# Patient Record
Sex: FEMALE | Race: WHITE | Employment: PART TIME | URBAN - METROPOLITAN AREA
[De-identification: names, ages, dates, MRNs, and addresses within clinical notes are randomized per-mention and may not be internally consistent; named-entity substitution may affect disease eponyms.]

---
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
## 2019-02-13 NOTE — PROGRESS NOTES
I think the biggest problem is that she is not absorbing it for some reason, the fetus won't see high doses when her blood levels are so low which is the reason I think we may need to pursue IV doses but can't find good data on this Inocente Thomason MD Jemma Rehman  : [facility] [address]LLA WHEAT  [address], [address], [facility] [place].  [phone]   [phone]         OUTPATIENT PHYSICAL THERAPY:Initial Assessment 2017    ICD-10: Treatment Diagnosis:  Other instability, right shoulder (M25.311), Other instability, left shoulder (M25.312)  Precautions/Allergies: NKDA  Fall Risk Score: 0 (? 5 = High Risk)  MD Orders: eval and treat, HEP, Strengthen, ROM MEDICAL/REFERRING DIAGNOSIS:  bob shoulder/ core/eccentric   DATE OF ONSET: chronic  REFERRING PHYSICIAN: Martha Powell MD  RETURN PHYSICIAN APPOINTMENT: May 15, 2017     INITIAL ASSESSMENT:  Ms. 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 She likely has multiple direction instability based on her history and also c/o BOB patella popping and some LBP. She presents with excessive BOB shoulder ROM and weakness in BOB rotator cuff and scapula stabilizers as well as weak quads and core. She needs to be able to do some lifting at her job and would like to participate in normal activities for her age. She should benefit from skilled therapy to address these dysfunctions and help her build a HEP that will improve her strength, decrease episodes of subluxation and decrease pain. PROBLEM LIST (Impacting functional limitations):  1. Decreased Strength  2. Decreased ADL/Functional Activities  3. Increased Pain INTERVENTIONS PLANNED  1. Modalities PRN, including ultrasound, estim, and iontophoresis  2. Soft tissue and joint mobilization for ROM and flexibility  3. Stretching, progressive resistive exercises and HEP for return to functional activities. 4. Back Education and Training for body mechanics with activities of daily living  5. If needed, aquatic therapy.    TREATMENT PLAN:  Effective Dates: 17 TO 17 Frequency/Duration: 2 times a week for 8 weeks  GOALS: (Goals have been discussed and agreed upon with patient.)  Short-Term Functional Goals: Time Frame: 4 weeks  1. Lumbar Protective Mechanism > 3/5 for improved ability to brace trunk with functional activities. 2. Demonstrates good posture with functional activities. (squat, reach overhead, turning). 3. Independent with initial level HEP. 4. Good quad and ankle control up/down stairs. Discharge Goals: Time Frame: 8 weeks  1. Eccentric strength with shoulder ER, IR, flexion, abduction > 4/5 for improved control of motion. 2. Able to do throwing, pushing, and lifting activities at moderate level for improved function. 3. DASH score < 15/55 for improved function. 4. Independent with discharge level HEP for continued strengthening. 5. Pain levels are less than 3/10 and are manageable/ tolerable. Rehabilitation Potential For Stated Goals: Good  Regarding Cristi Jimenez's therapy, I certify that the treatment plan above will be carried out by a therapist or under their direction. Thank you for this referral,  Getachew Yan PT     Referring Physician Signature: Lamberto Potter MD              Date                    The information in this section was collected on 4/18/17 (except where otherwise noted). HISTORY:   History of Present Injury/Illness (Reason for Referral):  Has history of chronic shoulder subluxation on BOB shoulder and was told she has multi directional instability on both shoulders L>R. She states that if she lifts something heavy it can slip down or sometimes will flip out side to side. REcently when her L shoulder subluxed she pinched \"something\" and then decided to be seen by Dr. Clement Lindsey. Mostly uses R arm for functional activities. Has not been diagnosed with general ligamentous instability but reports BOB knee cap popping as well and history of LBP.   Past Medical History/Comorbidities:   Surgical history: tonsils and adonoids removed 2010.  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 Prior Level of Function/Work/Activity:  Chronic condition that usually would just feel uncomfortable but this past year starting to feel pain and having more episodes of subluxing. Dominant Side:         RIGHT  Personal Factors:     Currently works and sometimes has to do activities that may aggravate shoulders  Current Medications: none       Date Last Reviewed:  4/18/17   Number of Personal Factors/Comorbidities that affect the Plan of Care: 1-2: MODERATE COMPLEXITY   EXAMINATION:   Observation/Orthostatic Postural Assessment:          Standing:  Stands with posterior trunk shift and mild forward shoulder; also note R innominate elevated. Supine:  Functional leg length discrepancy   Palpation:          Stiff and tender at L 1st rib, upper trap, posterior scalenes        Hyper mobile BOB patella  ROM:  Shoulder AROM for BOB shoulders is beyond normal ROM. BBO shoulder flexion > 180, ER at 90/90 ~ 100 degrees BOB. Cervical AROM      4/18/17     Rot R 80%     Rot L 100%     SB R 100%     SB L 75%                         Strength:  Core:  Lumbar Protective Mechanism = 1/5    shoulder 4/18 4/18     Right Left    flexion 4 4    abduction 4 4    extension      ER  4 4-    IR 5 5    scapula              Special Tests:          C1/2 rotational dysfunction; Load and shift; R grade 2 to 3 (stopped test); L grade 3 (stopped test because of laxity)   Body Structures Involved:  1. Joints  2. Muscles  3. Ligaments Body Functions Affected:  1. Movement Related Activities and Participation Affected:  1.  General Tasks and Demands   Number of elements (examined above) that affect the Plan of Care: 3: MODERATE COMPLEXITY   CLINICAL PRESENTATION:   Presentation: Evolving clinical presentation with changing clinical characteristics: MODERATE COMPLEXITY   CLINICAL DECISION MAKING: Outcome Measure: Tool Used: Disabilities of the Arm, Shoulder and Hand (DASH) Questionnaire - Quick Version  Score:  Initial: 19/55  Most Recent: X/55 (Date: -- )   Interpretation of Score: The DASH is designed to measure the activities of daily living in person's with upper extremity dysfunction or pain. Each section is scored on a 1-5 scale, 5 representing the greatest disability. The scores of each section are added together for a total score of 55. Medical Necessity:   · Patient is expected to demonstrate progress in strength and stablity to improve safety during lifting, pushing activities. .  Reason for Services/Other Comments:  · Patient continues to require skilled intervention due to need for skilled guidance with HEP/therex. .   Use of outcome tool(s) and clinical judgement create a POC that gives a: Questionable prediction of patient's progress: MODERATE COMPLEXITY            TREATMENT:   (In addition to Assessment/Re-Assessment sessions the following treatments were rendered)  Pre-treatment Symptoms/Complaints:  Chronic history of BOB shoulder subluxation - multi directional L > R.  C/o pain in joint and at posterior shoulder. Pain at BOB Upper trap and also at low back. Pain: Initial:   Pain Intensity 1: 9 (at worst; currently at 3-4/10)  Post Session:  No change in pain. THERAPEUTIC EXERCISE ( 20 min) to improve core and shoulder stability: Hook lye sustaihned isotonics for trunk flexion to exhaustion x 3, bridging x 10, green tband shoulder ER x 10, squat education for proper posture then progression to sustained isotonics for 20 sec, then held squat and did green tband row and extension x 10 ea    Treatment/Session Assessment:    · Response to Treatment:  Had difficulty maintaining squat but should continue to improve. Needed multiple cues for good shoulder posture and controlling shoulder through range of exercise given. · Compliance with Program/Exercises:  Will assess as treatment progresses. · Recommendations/Intent for next treatment session:  \"Next visit will focus on review of HEP as needed and progression of core/shoulder program.    Total Treatment Duration: 65 min  PT Patient Time In/Time Out  Time In: 3048  Time Out: Rashaad 55, PT MSPT

## 2021-08-29 ENCOUNTER — HOSPITAL ENCOUNTER (EMERGENCY)
Age: 21
Discharge: HOME | End: 2021-08-29
Attending: EMERGENCY MEDICINE
Payer: COMMERCIAL

## 2021-08-29 ENCOUNTER — APPOINTMENT (OUTPATIENT)
Dept: GENERAL RADIOLOGY | Age: 21
End: 2021-08-29
Attending: EMERGENCY MEDICINE
Payer: COMMERCIAL

## 2021-08-29 VITALS
BODY MASS INDEX: 25.61 KG/M2 | WEIGHT: 150 LBS | RESPIRATION RATE: 16 BRPM | DIASTOLIC BLOOD PRESSURE: 58 MMHG | SYSTOLIC BLOOD PRESSURE: 95 MMHG | HEIGHT: 64 IN | HEART RATE: 78 BPM | OXYGEN SATURATION: 98 % | TEMPERATURE: 98.8 F

## 2021-08-29 DIAGNOSIS — R55 SYNCOPE, UNSPECIFIED SYNCOPE TYPE: Primary | ICD-10-CM

## 2021-08-29 LAB
ALBUMIN SERPL-MCNC: 4.1 G/DL (ref 3.5–5)
ALBUMIN/GLOB SERPL: 1 {RATIO} (ref 1.2–3.5)
ALP SERPL-CCNC: 91 U/L (ref 50–136)
ALT SERPL-CCNC: 21 U/L (ref 12–65)
ANION GAP SERPL CALC-SCNC: 5 MMOL/L (ref 7–16)
AST SERPL-CCNC: 18 U/L (ref 15–37)
ATRIAL RATE: 81 BPM
BASOPHILS # BLD: 0 K/UL (ref 0–0.2)
BASOPHILS NFR BLD: 1 % (ref 0–2)
BILIRUB SERPL-MCNC: 0.4 MG/DL (ref 0.2–1.1)
BUN SERPL-MCNC: 9 MG/DL (ref 6–23)
CALCIUM SERPL-MCNC: 9.6 MG/DL (ref 8.3–10.4)
CALCULATED P AXIS, ECG09: -26 DEGREES
CALCULATED R AXIS, ECG10: 89 DEGREES
CALCULATED T AXIS, ECG11: 63 DEGREES
CHLORIDE SERPL-SCNC: 107 MMOL/L (ref 98–107)
CO2 SERPL-SCNC: 25 MMOL/L (ref 21–32)
CREAT SERPL-MCNC: 0.9 MG/DL (ref 0.6–1)
D DIMER PPP FEU-MCNC: <0.27 UG/ML(FEU)
DIAGNOSIS, 93000: NORMAL
DIFFERENTIAL METHOD BLD: NORMAL
EOSINOPHIL # BLD: 0.1 K/UL (ref 0–0.8)
EOSINOPHIL NFR BLD: 1 % (ref 0.5–7.8)
ERYTHROCYTE [DISTWIDTH] IN BLOOD BY AUTOMATED COUNT: 12.8 % (ref 11.9–14.6)
GLOBULIN SER CALC-MCNC: 4 G/DL (ref 2.3–3.5)
GLUCOSE SERPL-MCNC: 72 MG/DL (ref 65–100)
HCT VFR BLD AUTO: 41.1 % (ref 35.8–46.3)
HGB BLD-MCNC: 13.8 G/DL (ref 11.7–15.4)
IMM GRANULOCYTES # BLD AUTO: 0 K/UL (ref 0–0.5)
IMM GRANULOCYTES NFR BLD AUTO: 0 % (ref 0–5)
LYMPHOCYTES # BLD: 1.9 K/UL (ref 0.5–4.6)
LYMPHOCYTES NFR BLD: 22 % (ref 13–44)
MCH RBC QN AUTO: 29.7 PG (ref 26.1–32.9)
MCHC RBC AUTO-ENTMCNC: 33.6 G/DL (ref 31.4–35)
MCV RBC AUTO: 88.4 FL (ref 79.6–97.8)
MONOCYTES # BLD: 0.5 K/UL (ref 0.1–1.3)
MONOCYTES NFR BLD: 6 % (ref 4–12)
NEUTS SEG # BLD: 6.2 K/UL (ref 1.7–8.2)
NEUTS SEG NFR BLD: 70 % (ref 43–78)
NRBC # BLD: 0 K/UL (ref 0–0.2)
P-R INTERVAL, ECG05: 136 MS
PLATELET # BLD AUTO: 286 K/UL (ref 150–450)
PMV BLD AUTO: 10.7 FL (ref 9.4–12.3)
POTASSIUM SERPL-SCNC: 4.1 MMOL/L (ref 3.5–5.1)
PROT SERPL-MCNC: 8.1 G/DL (ref 6.3–8.2)
Q-T INTERVAL, ECG07: 346 MS
QRS DURATION, ECG06: 74 MS
QTC CALCULATION (BEZET), ECG08: 401 MS
RBC # BLD AUTO: 4.65 M/UL (ref 4.05–5.2)
SODIUM SERPL-SCNC: 137 MMOL/L (ref 136–145)
VENTRICULAR RATE, ECG03: 81 BPM
WBC # BLD AUTO: 8.7 K/UL (ref 4.3–11.1)

## 2021-08-29 PROCEDURE — 99283 EMERGENCY DEPT VISIT LOW MDM: CPT

## 2021-08-29 PROCEDURE — 71045 X-RAY EXAM CHEST 1 VIEW: CPT

## 2021-08-29 PROCEDURE — 85379 FIBRIN DEGRADATION QUANT: CPT

## 2021-08-29 PROCEDURE — 93005 ELECTROCARDIOGRAM TRACING: CPT

## 2021-08-29 PROCEDURE — 85025 COMPLETE CBC W/AUTO DIFF WBC: CPT

## 2021-08-29 PROCEDURE — 80053 COMPREHEN METABOLIC PANEL: CPT

## 2021-08-29 RX ORDER — SODIUM CHLORIDE 0.9 % (FLUSH) 0.9 %
5-10 SYRINGE (ML) INJECTION AS NEEDED
Status: DISCONTINUED | OUTPATIENT
Start: 2021-08-29 | End: 2021-08-29 | Stop reason: HOSPADM

## 2021-08-29 RX ORDER — SODIUM CHLORIDE 0.9 % (FLUSH) 0.9 %
5-10 SYRINGE (ML) INJECTION EVERY 8 HOURS
Status: DISCONTINUED | OUTPATIENT
Start: 2021-08-29 | End: 2021-08-29 | Stop reason: HOSPADM

## 2021-08-29 NOTE — ED TRIAGE NOTES
Pt states she was working in the yard this afternoon and started having intense chest pain and SOB. States deep breathing made it feel better so she continued working and then passed out. Denies medical hx. Masked for triage.

## 2021-08-29 NOTE — LETTER
NOTIFICATION RETURN TO WORK / SCHOOL    8/29/2021 6:57 PM    Ms. Laura Brewster  [address] [address]      To Whom It May Concern:    Laura Brewster is currently under the care of [facility] EMERGENCY DEPT. She will return to work/school on: 08/30/21    Laura Brewster may return to work/school with the following restrictions: NA    If there are questions or concerns please have the patient contact our office.         Sincerely,      Ezekiel Sharp RN

## 2021-08-29 NOTE — ED PROVIDER NOTES
23-year-old lady presents with concerns about pleuritic chest pain and passing out. She says she was outside working on a deck when she started to feel some pain and then passed out. Says she just felt very fatigued and worn out after that. She says she has had no fevers or chills and no cough or difficulty breathing. She notes a couple years ago she had episodes where she felt like her heart was racing. She did Holter monitoring with cardiology with no specific findings. She denies any significant medical problems. Her last menstrual cycle was about 2 weeks ago. Elements of this note were created using speech recognition software. As such, errors of speech recognition may be present. Past Medical History:   Diagnosis Date    Strep tonsillitis     chronic as child: tonsillectomy       Past Surgical History:   Procedure Laterality Date    HX TONSILLECTOMY           No family history on file. Social History     Socioeconomic History    Marital status: SINGLE     Spouse name: Not on file    Number of children: Not on file    Years of education: Not on file    Highest education level: Not on file   Occupational History    Not on file   Tobacco Use    Smoking status: Never Smoker    Smokeless tobacco: Never Used   Substance and Sexual Activity    Alcohol use: No    Drug use: No    Sexual activity: Not on file   Other Topics Concern    Not on file   Social History Narrative    Not on file     Social Determinants of Health     Financial Resource Strain:     Difficulty of Paying Living Expenses:    Food Insecurity:     Worried About Running Out of Food in the Last Year:     [address] in the Last Year:    Transportation Needs:     Lack of Transportation (Medical):      Lack of Transportation (Non-Medical):    Physical Activity:     Days of Exercise per Week:     Minutes of Exercise per Session:    Stress:     Feeling of Stress :    Social Connections:     Frequency of Communication with Friends and Family:     Frequency of Social Gatherings with Friends and Family:     Attends Sikh Services:     Active Member of Clubs or Organizations:     Attends Club or Organization Meetings:     Marital Status:    Intimate Partner Violence:     Fear of Current or Ex-Partner:     Emotionally Abused:     Physically Abused:     Sexually Abused: ALLERGIES: Latex    Review of Systems   Constitutional: Negative for chills, diaphoresis and fever. HENT: Negative for congestion, rhinorrhea and sore throat. Eyes: Negative for redness and visual disturbance. Respiratory: Negative for cough, chest tightness, shortness of breath and wheezing. Cardiovascular: Positive for chest pain. Negative for palpitations. Gastrointestinal: Negative for abdominal pain, blood in stool, diarrhea, nausea and vomiting. Endocrine: Negative for polydipsia and polyuria. Genitourinary: Negative for dysuria and hematuria. Musculoskeletal: Negative for arthralgias, myalgias and neck stiffness. Skin: Negative for rash. Allergic/Immunologic: Negative for environmental allergies and food allergies. Neurological: Positive for syncope. Negative for dizziness, weakness and headaches. Hematological: Negative for adenopathy. Does not bruise/bleed easily. Psychiatric/Behavioral: Negative for confusion and sleep disturbance. The patient is not nervous/anxious. Vitals:    08/29/21 1637   BP: 104/71   Pulse: 90   Resp: 20   Temp: 98.6 °F (37 °C)   SpO2: 98%   Weight: 68 kg (150 lb)   Height: 5' 4\" (1.626 m)            Physical Exam  Vitals and nursing note reviewed. Constitutional:       General: She is not in acute distress. Appearance: She is well-developed. She is not toxic-appearing. HENT:      Head: Normocephalic and atraumatic. Eyes:      General: No scleral icterus. Right eye: No discharge. Left eye: No discharge.       Conjunctiva/sclera: Conjunctivae normal.      Pupils: Pupils are equal, round, and reactive to light. Cardiovascular:      Rate and Rhythm: Normal rate and regular rhythm. Heart sounds: Normal heart sounds. Pulmonary:      Effort: Pulmonary effort is normal. No respiratory distress. Breath sounds: Normal breath sounds. No wheezing or rales. Chest:      Chest wall: No tenderness. Abdominal:      General: Bowel sounds are normal. There is no distension. Palpations: Abdomen is soft. Tenderness: There is no guarding or rebound. Musculoskeletal:         General: No tenderness. Normal range of motion. Cervical back: Normal range of motion. No rigidity. Lymphadenopathy:      Cervical: No cervical adenopathy. Skin:     General: Skin is warm and dry. Neurological:      General: No focal deficit present. Mental Status: She is alert and oriented to person, place, and time. Psychiatric:         Mood and Affect: Mood normal.         Behavior: Behavior normal.          MDM  Number of Diagnoses or Management Options  Diagnosis management comments: EKG review by ER doctor:  Normal sinus rhythm  No acute ischemic ST segment changes  No QTC prolongation    I will check a pregnancy test and her basic blood work. I will also do a Covid swab. ED Course as of Aug 29 1819   Luz Poole Aug 29, 2021   1814 Patient no longer wants to stay in the emergency department. She says she would rather go home.   I advised her that I did not have all of her test results back including a D-dimer or a Covid test.  She declined the Covid test.  I will respect her wishes and discharge her home.    [AC]      ED Course User Index  [AC] Harmony Kovacs MD       Procedures

## 2021-08-29 NOTE — ED NOTES
I have reviewed discharge instructions with the patient. The patient verbalized understanding. Patient left ED via Discharge Method: ambulatory to Home with herself. Opportunity for questions and clarification provided. Patient given 0 scripts. To continue your aftercare when you leave the hospital, you may receive an automated call from our care team to check in on how you are doing. This is a free service and part of our promise to provide the best care and service to meet your aftercare needs.  If you have questions, or wish to unsubscribe from this service please call [phone]. Thank you for Choosing our [facility] Emergency Department.

## 2021-08-29 NOTE — DISCHARGE INSTRUCTIONS
As we discussed, her evaluation is incomplete. I encourage you to stay for the remainder of your testing. Please follow-up with your primary care doctor as soon as possible for further evaluation. Otherwise, please return to the emergency department with any fevers, increasing pain, vomiting, difficulty breathing, or additional concerns.

## 2022-03-19 PROBLEM — M25.312 INSTABILITY OF LEFT SHOULDER JOINT: Status: ACTIVE | Noted: 2017-06-26

## 2024-03-27 ENCOUNTER — TELEPHONE (OUTPATIENT)
Dept: ORTHOPEDIC SURGERY | Age: 24
End: 2024-03-27

## 2024-03-27 NOTE — TELEPHONE ENCOUNTER
Pt called and dislocated her right elbow. She has had it reset but its still very painful and hot to the touch. She would like to be se seen.

## (undated) DEVICE — SOLUTION IRRIG 3000ML 0.9% SOD CHL FLX CONT 0797208] ICU MEDICAL INC]

## (undated) DEVICE — OUTFLOW CASSETTE TUBING, DO NOT USE IF PACKAGE IS DAMAGED: Brand: CROSSFLOW

## (undated) DEVICE — ACCU-PASS SUTURE SHUTTLE 45                                    DEGREE, UPBEND, STERILE: Brand: ACCU-PASS

## (undated) DEVICE — [RESECTOR CUTTER, ARTHROSCOPIC SHAVER BLADE,  DO NOT RESTERILIZE,  DO NOT USE IF PACKAGE IS DAMAGED,  KEEP DRY,  KEEP AWAY FROM SUNLIGHT]: Brand: FORMULA

## (undated) DEVICE — PACK,SHOULDER,DRAPE,POUCH: Brand: MEDLINE

## (undated) DEVICE — (D)PREP SKN CHLRAPRP APPL 26ML -- CONVERT TO ITEM 371833

## (undated) DEVICE — GOWN,REINFORCED,POLY,AURORA,XXLARGE,STR: Brand: MEDLINE

## (undated) DEVICE — NDL SPNE QNCKE 18GX3.5IN LF --

## (undated) DEVICE — [ARTHROSCOPY PUMP,  DO NOT USE IF PACKAGE IS DAMAGED,  KEEP DRY,  KEEP AWAY FROM SUNLIGHT,  PROTECT FROM HEAT AND RADIOACTIVE SOURCES.]: Brand: FLOSTEADY

## (undated) DEVICE — SURGICAL PROCEDURE PACK BASIC ST FRANCIS

## (undated) DEVICE — GUARDIAN LVC: Brand: GUARDIAN

## (undated) DEVICE — 2000CC GUARDIAN II: Brand: GUARDIAN

## (undated) DEVICE — BLADE SHV CUT MENIS AGG + 4MM --

## (undated) DEVICE — 3M™ STERI-DRAPE™ INCISE DRAPE 1050 (60CM X 45CM): Brand: STERI-DRAPE™

## (undated) DEVICE — MEDI-VAC NON-CONDUCTIVE SUCTION TUBING: Brand: CARDINAL HEALTH

## (undated) DEVICE — PAD,ABDOMINAL,5"X9",STERILE,LF,1/PK: Brand: MEDLINE INDUSTRIES, INC.

## (undated) DEVICE — CARDINAL HEALTH FLEXIBLE LIGHT HANDLE COVER: Brand: CARDINAL HEALTH

## (undated) DEVICE — [DISTAL THREADED CANNULA.  DO NOT RESTERILIZE,  DO NOT USE IF PACKAGE IS DAMAGED]: Brand: DRI-LOK

## (undated) DEVICE — ACCU-PASS SUTURE SHUTTLE 70                                    DEGREE, UPBEND, STERILE: Brand: ACCU-PASS

## (undated) DEVICE — 90-S, SUCTION PROBE, NON-BENDABLE, MAX CUT LEVEL 11: Brand: SERFAS ENERGY

## (undated) DEVICE — SHOULDER SUSPENSION KIT 6 PER BOX

## (undated) DEVICE — SUTURE ETHLN SZ 2-0 L18IN NONABSORBABLE BLK L26MM PS 3/8 585H

## (undated) DEVICE — INFLOW CASSETTE TUBING, DO NOT USE IF PACKAGE IS DAMAGED: Brand: CROSSFLOW

## (undated) DEVICE — CANNULA SMOOTH FLEX 8.0 X 72MM: Brand: CLEAR-TRAC